# Patient Record
Sex: FEMALE | Race: WHITE
[De-identification: names, ages, dates, MRNs, and addresses within clinical notes are randomized per-mention and may not be internally consistent; named-entity substitution may affect disease eponyms.]

---

## 2017-01-12 ENCOUNTER — HOSPITAL ENCOUNTER (OUTPATIENT)
Dept: HOSPITAL 80 - BMCIMAGING | Age: 77
End: 2017-01-12
Attending: INTERNAL MEDICINE
Payer: COMMERCIAL

## 2017-01-12 DIAGNOSIS — R05: Primary | ICD-10-CM

## 2017-01-12 NOTE — DX
PA and Lateral Chest

 

Clinical Indications:  Cough in a 76-year-old female.

 

Comparison: April 5, 2013.

 

Findings:  No focal pulmonary consolidation is seen. Mild peribronchial thickening is identified. The
re is hyperexpansion seen with flattening of the hemidiaphragms noted. 

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 

The heart size and pulmonary vascularity are normal. Pleural surfaces and bony thorax are negative fo
r acute abnormality.

 

Impression: Query COPD/chronic bronchitis with no superimposed acute abnormality identified.

## 2017-07-07 ENCOUNTER — HOSPITAL ENCOUNTER (OUTPATIENT)
Dept: HOSPITAL 80 - FIMAGING | Age: 77
End: 2017-07-07
Attending: INTERNAL MEDICINE
Payer: COMMERCIAL

## 2017-07-07 DIAGNOSIS — E04.1: Primary | ICD-10-CM

## 2018-06-29 ENCOUNTER — HOSPITAL ENCOUNTER (OUTPATIENT)
Dept: HOSPITAL 80 - FIMAGING | Age: 78
End: 2018-06-29
Attending: INTERNAL MEDICINE
Payer: COMMERCIAL

## 2018-06-29 DIAGNOSIS — Z12.31: Primary | ICD-10-CM
